# Patient Record
Sex: FEMALE | Race: WHITE
[De-identification: names, ages, dates, MRNs, and addresses within clinical notes are randomized per-mention and may not be internally consistent; named-entity substitution may affect disease eponyms.]

---

## 2018-07-17 NOTE — PCM.PREANE
Preanesthetic Assessment





- Procedure


Proposed Procedure: 





Lap APPY





- Anesthesia/Transfusion/Family Hx


Anesthesia History: Prior Anesthesia Without Reaction


Family History of Anesthesia Reaction: No


Transfusion History: No Prior Transfusion(s)





- Review of Systems


General: No Symptoms


Pulmonary: Other (asthma- prn use of inhaler )


Cardiovascular: No Symptoms


Gastrointestinal: No Symptoms


Neurological: No Symptoms


Other: Reports: Anxiety (not medicated )





- Physical Assessment


NPO Status Date: 07/16/18


NPO Status Time: 14:00


O2 Sat by Pulse Oximetry: 100


Respiratory Rate: 18


Vital Signs: 





 Last Vital Signs











Temp  36.4 C   07/17/18 01:12


 


Pulse  68   07/17/18 01:12


 


Resp  18   07/17/18 01:12


 


BP  119/67   07/17/18 01:12


 


Pulse Ox  100   07/17/18 01:12











Height: 1.57 m


Weight: 69.4 kg


ASA Class: 2


Mental Status: Alert & Oriented x3


Airway Class: Mallampati = 1


Dentition: Reports: Normal Dentition


Thyro-Mental Finger Breadths: 3


Mouth Opening Finger Breadths: 3


ROM/Head Extension: Full


Lungs: Clear to Auscultation, Normal Respiratory Effort


Cardiovascular: Regular Rate, Regular Rhythm





- Lab


Values: 





 Laboratory Last Values











WBC  11.43 K/mm3 (3.98-10.04)  H  07/17/18  01:50    


 


RBC  5.14 M/mm3 (3.98-5.22)   07/17/18  01:50    


 


Hgb  14.6 gm/L (11.2-15.7)   07/17/18  01:50    


 


Hct  43.3 % (34.1-44.9)   07/17/18  01:50    


 


MCV  84.2 fl (79.4-94.8)   07/17/18  01:50    


 


MCH  28.4 pg (25.6-32.2)   07/17/18  01:50    


 


MCHC  33.7 g/dl (32.2-35.5)   07/17/18  01:50    


 


RDW Std Deviation  41.7 fL (36.4-46.3)   07/17/18  01:50    


 


Plt Count  219 K/mm3 (182-369)   07/17/18  01:50    


 


MPV  10.7 fl (9.4-12.3)   07/17/18  01:50    


 


Neutrophils % (Manual)  90 % (40-60)  H  07/17/18  01:50    


 


Band Neutrophils %  0 % (0-10)   07/17/18  01:50    


 


Lymphocytes % (Manual)  4 % (20-40)  L  07/17/18  01:50    


 


Atypical Lymphs %  3 %  07/17/18  01:50    


 


Monocytes % (Manual)  0 % (2-10)  L  07/17/18  01:50    


 


Eosinophils % (Manual)  2 % (0.7-5.8)   07/17/18  01:50    


 


Basophils % (Manual)  1  (0.1-1.2)   07/17/18  01:50    


 


Platelet Estimate  Adequate   07/17/18  01:50    


 


Plt Morphology Comment  Normal   07/17/18  01:50    


 


RBC Morph Comment  Normal   07/17/18  01:50    


 


Sodium  138 mEq/L (136-145)   07/17/18  01:50    


 


Potassium  3.4 mEq/L (3.5-5.1)  L  07/17/18  01:50    


 


Chloride  104 mEq/L ()   07/17/18  01:50    


 


Carbon Dioxide  24 mEq/L (21-32)   07/17/18  01:50    


 


Anion Gap  13.4  (5-15)   07/17/18  01:50    


 


BUN  12 mg/dL (7-18)   07/17/18  01:50    


 


Creatinine  1.1 mg/dL (0.55-1.02)  H  07/17/18  01:50    


 


Est Cr Clr Drug Dosing  64.52 mL/min  07/17/18  01:50    


 


Estimated GFR (MDRD)  > 60 mL/min (>60)   07/17/18  01:50    


 


BUN/Creatinine Ratio  10.9  (14-18)  L  07/17/18  01:50    


 


Glucose  104 mg/dL ()   07/17/18  01:50    


 


Calcium  8.9 mg/dL (8.5-10.1)   07/17/18  01:50    


 


Total Bilirubin  0.4 mg/dL (0.2-1.0)   07/17/18  01:50    


 


AST  17 U/L (15-37)   07/17/18  01:50    


 


ALT  27 U/L (14-59)   07/17/18  01:50    


 


Alkaline Phosphatase  73 U/L ()   07/17/18  01:50    


 


Total Protein  8.1 g/dl (6.4-8.2)   07/17/18  01:50    


 


Albumin  4.1 g/dl (3.4-5.0)   07/17/18  01:50    


 


Globulin  4.0 gm/dL  07/17/18  01:50    


 


Albumin/Globulin Ratio  1.0  (1-2)   07/17/18  01:50    


 


Lipase  87 U/L ()   07/17/18  01:50    


 


Urine Color  Yellow  (Yellow)   07/17/18  02:04    


 


Urine Appearance  Clear  (Clear)   07/17/18  02:04    


 


Urine pH  5.5  (5.0-8.0)   07/17/18  02:04    


 


Ur Specific Gravity  > or = 1.030  (1.005-1.030)   07/17/18  02:04    


 


Urine Protein  Negative  (Negative)   07/17/18  02:04    


 


Urine Glucose (UA)  Negative  (Negative)   07/17/18  02:04    


 


Urine Ketones  Negative  (Negative)   07/17/18  02:04    


 


Urine Occult Blood  Negative  (Negative)   07/17/18  02:04    


 


Urine Nitrite  Negative  (Negative)   07/17/18  02:04    


 


Urine Bilirubin  Negative  (Negative)   07/17/18  02:04    


 


Urine Urobilinogen  0.2  (0.2-1.0)   07/17/18  02:04    


 


Ur Leukocyte Esterase  Negative  (Negative)   07/17/18  02:04    


 


Urine RBC  0-5 /hpf (0-5)   07/17/18  02:04    


 


Urine WBC  0-5 /hpf (0-5)   07/17/18  02:04    


 


Ur Epithelial Cells  0-5 /hpf (0-5)   07/17/18  02:04    


 


Urine Bacteria  Rare /hpf (FEW)   07/17/18  02:04    


 


Urine Mucus  Moderate /hpf (FEW)  H  07/17/18  02:04    


 


Urine HCG, Qual  Negative  (NEGATIVE)   07/17/18  02:04    














- Allergies


Allergies/Adverse Reactions: 


 Allergies











Allergy/AdvReac Type Severity Reaction Status Date / Time


 


No Known Allergies Allergy   Verified 07/17/18 01:14














- Blood


Blood Available: No


Product(s) Available: None





- Anesthesia Plan


Pre-Op Medication Ordered: None





- Acknowledgements


Anesthesia Type Planned: General Anesthesia


Pt an Appropriate Candidate for the Planned Anesthesia: Yes


Alternatives and Risks of Anesthesia Discussed w Pt/Guardian: Yes


Pt/Guardian Understands and Agrees with Anesthesia Plan: Yes





PreAnesthesia Questionnaire





- Past Surgical History


HEENT Surgical History: Reports: Oral Surgery ([place] teeth extraction)





- SUBSTANCE USE


Smoking Status *Q: Never Smoker


Second Hand Smoke Exposure: No


Recreational Drug Use History: Yes


Recreational Drug Type: Reports: Marijuana/Hashish (last smoked May 2018)





- HOME MEDS


Home Medications: 


 Home Meds





. [No Known Home Meds]  07/17/18 [History]











- CURRENT (IN HOUSE) MEDS


Current Meds: 





 Current Medications





Sodium Chloride (Normal Saline)  1,000 mls @ 150 mls/hr IV ASDIRECTED LOUIE


   Last Admin: 07/17/18 01:55 Dose:  150 mls/hr





Discontinued Medications





Diatrizoate Meglum/Diatrizoate Sod (Gastrografin 37%)  120 ml PO ONETIME ONE


   Stop: 07/17/18 03:20


   Last Admin: 07/17/18 03:21 Dose:  120 ml


Diatrizoate Meglum/Diatrizoate Sod (Gastrografin 37%)  120 ml PO ONETIME ONE


   Stop: 07/17/18 03:20


Hydromorphone HCl (Dilaudid)  0.5 mg IVPUSH ONETIME STA


   Stop: 07/17/18 01:47


   Last Admin: 07/17/18 01:58 Dose:  0.5 mg


Hydromorphone HCl (Dilaudid)  0.5 mg IVPUSH ONETIME ONE


   Stop: 07/17/18 04:38


   Last Admin: 07/17/18 04:42 Dose:  0.5 mg


Ertapenem 1 gm/ Sodium (Chloride)  100 mls @ 200 mls/hr IV ONETIME ONE


   Stop: 07/17/18 04:40


   Last Admin: 07/17/18 04:33 Dose:  200 mls/hr


Iopamidol (Isovue-300 (61%))  125 ml IVPUSH ONETIME ONE


   Stop: 07/17/18 03:20


   Last Admin: 07/17/18 03:21 Dose:  150 ml


Ondansetron HCl (Zofran)  4 mg IVPUSH ONETIME ONE


   Stop: 07/17/18 01:45


   Last Admin: 07/17/18 01:55 Dose:  4 mg


Ondansetron HCl (Zofran)  4 mg IVPUSH ONETIME ONE


   Stop: 07/17/18 04:57


   Last Admin: 07/17/18 05:01 Dose:  4 mg
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
## 2018-07-17 NOTE — PCM.POSTAN
POST ANESTHESIA ASSESSMENT





- MENTAL STATUS


Mental Status: Alert, Oriented





- VITAL SIGNS


Pulse Rate: 64


SaO2: 98


Resp Rate: 13


Blood Pressure: 136/96


Temperature: 36.7 C





- RESPIRATORY


Respiratory Status: Respiratory Rate WNL, Airway Patent, O2 Saturation Stable, 

Supplemental Oxygen





- CARDIOVASCULAR


CV Status: Pulse Rate WNL, Blood Pressure Stable





- GASTROINTESTINAL


GI Status: No Symptoms





- PAIN


Pain Score: 0





- POST OP HYDRATION


Hydration Status: Adequate & Stable

## 2018-07-17 NOTE — HP
DATE OF ADMISSION:  07/17/2018

 

CHIEF COMPLAINT:

Right lower quadrant pain.

 

HISTORY OF PRESENT ILLNESS:

Tim Seay is a very pleasant 18-year-old female who was last evening about 8

just did not feel well.  She had got vague abdominal pain and some nausea.  Over

the course of the evening, it localized to the right lower quadrant.  It is such

that she did not like to do any movement at all.  She has never had any of these

problems before.  She is not pregnant and is currently taking birth control.

She has never had any associated vaginal discharge or bloody diarrhea or

hematuria or dysuria.

 

She was subsequently seen here and had a CT scan which is consistent with acute

appendicitis.

 

Currently, the patient states she feels slightly better, but still is very sore

in the right lower quadrant.  She remains anorexic and she also states that she

is not hungry.

 

PAST MEDICAL HISTORY:

 

 

ALLERGIES:

As a child, she could not take penicillin, but according to her mother, she

feels she did take some of the other cillins.

 

CURRENT MEDICATIONS:

Birth control as well as an inhaler.

 

PAST SURGICAL HISTORY:

Negative.

 

SOCIAL HISTORY:

Smoking negative.  Alcohol socially.  She is single.  Her boyfriend is at her

side.  She has brothers and sisters who are alive and well.  Her mother had

undergone treatment for breast cancer.

 

REVIEW OF SYSTEMS:

No history of seizures, strokes, thyroid diabetes or hepatitis.  She denies

shortness of breath or cough.  No history of palpitations.  No history of

abdominal ulcers or any prior abdominal pain.  No change in her bowel habits.

No hematuria, dysuria.  She denies joint pain.  She denies bleeding disorders

and she denies ankle swelling or edema.

 

OCCUPATION:

Her occupation is a dancer.

 

PHYSICAL EXAMINATION:

GENERAL:  She is now comfortable.

HEENT:  Pupils equal.

NECK:  Without mass.

LUNGS:  Clear.

HEART:  Rhythm is regular.

ABDOMEN:  Mildly tender to deep palpation in the right lower quadrant, slightly

guarding.

EXTREMITIES:  Ankles are free of edema.

 

IMAGING STUDIES:

CT scan has been reviewed and clearly there is a markedly dilated very large

acute appendix.

 

IMPRESSION AND PLAN:

1. Indeed, she does have an acute appendicitis.  We discussed the multiple

    options now that one has with treating acute appendicitis, nonoperatively

    versus operatively.  Unfortunately, I think that due to the fact that the

    appendix is so markedly distended, that I would hesitate to treat this

    nonoperatively.  I clearly gave her this option, but I would recommend an

    attempt of laparoscopic appendectomy.  Risks of surgery include, but not

    limited to bleeding, infection, heart attack, death, injury to structures,

    not intended the need for open procedure, the need for a drain.  She

    understands if we do an open procedure, it will be midline.  Even with

    these risks, she wishes to proceed and will go ahead at this time.

2. History of asthma.

 

Thank you very much.  Her mother was at her side and was in agreement.

 

DD:  07/17/2018 06:37:13

DT:  07/17/2018 07:38:26  SERENA

Job #:  [id number]

## 2018-07-17 NOTE — CT
CT abdomen and pelvis

 

Technique: Multiple axial sections were obtained from above the dome 

of the diaphragm inferiorly through the pubic symphysis.  Intravenous 

and oral contrast was utilized.  Delayed images were obtained through 

the bladder.

 

Comparison: No prior abdominal imaging.

 

Findings: Appendix is dilated showing mild surrounding inflammatory 

change compatible with appendicitis.

 

Visualized lung bases show nothing acute.  Liver shows no focal 

parenchymal abnormality.  Gallbladder contains no calcified 

gallstones.  Spleen appears within normal limits.  Adrenal glands 

contain no nodules.  Pancreas is within normal limits.  Kidneys show 

symmetric contrast enhancement without hydronephrosis or mass.  

Abdominal aorta shows no aneurysmal dilatation.  No retroperitoneal 

adenopathy or mesenteric abnormalities are seen.  No pelvic mass or 

adenopathy is seen.  Minimal free fluid is seen within the pelvis 

which most likely is physiologic.

 

Delayed images show contrast within the distal ureters and within the 

bladder.

 

Bone window settings were reviewed and within normal limits for the 

patient's age.

 

Impression:

1.  Findings compatible with appendicitis.

2.  Small amount of fluid within the pelvis most likely representing 

physiologic fluid.

 

Diagnostic code #5

 

I agree with preliminary report issued by [facility] ([facility] report finalized 

on 07/17/18, 5:04 AM Central Time)

## 2018-07-18 NOTE — OR
DATE OF OPERATION:  07/17/2018

 

SURGEON:  Andreina Garcia MD

 

PREOPERATIVE DIAGNOSIS:

Acute appendicitis.

 

POSTOPERATIVE DIAGNOSIS:

Acute appendicitis.

 

OPERATION PERFORMED:  Laparoscopic appendectomy.

 

ANESTHESIA:

General with intubation.

 

ESTIMATED BLOOD LOSS:

Less than 10 mL.

 

REPLACEMENT:

Crystalloid.

 

BRIEF HISTORY:

This is a 18-year-old female, who arrived here with all the signs and symptoms

of acute appendicitis, which was confirmed by CT scan.  I had the opportunity to

discuss the risks and benefits of surgery, and she agreed to proceed.  Of note,

we did discuss non-operative surgery, but because of the way that the looks on

the CT scan, I do not think that this would be wise.

 

The patient was taken to the operating room.  A time-out was performed.  The

patient's abdomen was prepped and draped in usual fashion.  We waited for the 3

minutes for the prep to dry.  I injected 1% lidocaine in the infraumbilical area

and the suprapubic area.  A transverse incision was made and I dissected down to

the fascia.  I lifted the fascia between 2 sterile hemostats and made a nick in

the fascia.  I advanced the Veress needle and CO2 was insufflated successfully.

I then advanced a 5 mm port and confirmed that we had intraperitoneal

positioning.  I could see the inflamed appendix.  A suprapubic 12 and a 5 mm in

the right upper quadrant were placed under direct vision.  At this point, I

could easily see that the appendix was inflamed.  The base, however, was nice

and small.  I was able to make a window at the base of the appendix and

connected to the cecum and then fired the stapling device across this

successfully.  I then fired the stapling device, a portion of the mesoappendix

to start dividing this, but there was a slight bleeder in one of them, and I had

used multiple hemoclips.  Therefore, I elected to take down the rest of the

mesoappendix using hemoclips.  At the completion of this, I was able to place

this appendix in an Endobag and bring it out through the suprapubic incision.  I

then reassessed for hemostasis and irrigated.  There were just a couple little

oozing spots, which I was able to successfully place hemoclips to the point

where there was complete and good hemostasis.

 

I then irrigated and removed a little bit of blood that was in the

intraperitoneal cavity and I removed the fluid that had dripped down into the

pelvis.  Again, hemostasis was good and I placed the bowel in its normal

anatomical position.

 

At this point in time, I removed the suprapubic under direct vision, no

bleeding, and the 5 mm under direct vision and no bleeding was noted.  The

camera was removed.  I reapproximated the fascia of the 12 and the

infraumbilical 5 with a figure-of-eight using 0 PDS.  Subcuticular closure was

utilized with 4-0.  Benzoin and Steri-Strips were applied.

 

The patient tolerated the procedure well, was now brought to recovery room.

 

Instructions will be, she can shower tomorrow with the dressings off.

 

She should come back and see me Friday for followup, sooner if there are any

problems.

 

We are anticipating getting her home here today as she lives in [facility].

 

 

 

DD:  07/17/2018 08:36:37

DT:  07/17/2018 09:19:42  SERENA

Job #:  [id number]

## 2021-01-19 ENCOUNTER — HOSPITAL ENCOUNTER (EMERGENCY)
Dept: HOSPITAL 41 - JD.ED | Age: 21
Discharge: HOME | End: 2021-01-19
Payer: COMMERCIAL
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
## 2021-01-19 DIAGNOSIS — S20.211A: ICD-10-CM

## 2021-01-19 DIAGNOSIS — S50.11XA: Primary | ICD-10-CM
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
## 2021-01-19 NOTE — CR
Chest: 2 views of the chest were obtained.

 

Comparison: No prior chest imaging is available.

 

Heart size and mediastinum are normal.  Lungs are clear with no acute 

parenchymal change.  Bony structures are unremarkable.

 

Impression:

1.  Nothing acute is seen on 2 view chest x-ray.

 

Diagnostic code #1

## 2021-10-02 ENCOUNTER — HOSPITAL ENCOUNTER (EMERGENCY)
Dept: HOSPITAL 41 - JD.ED | Age: 21
Discharge: HOME | End: 2021-10-02
Payer: COMMERCIAL

## 2021-10-02 DIAGNOSIS — J45.909: ICD-10-CM

## 2021-10-02 DIAGNOSIS — O02.1: Primary | ICD-10-CM

## 2021-10-02 DIAGNOSIS — Z20.822: ICD-10-CM

## 2021-10-02 PROCEDURE — 84702 CHORIONIC GONADOTROPIN TEST: CPT

## 2021-10-02 PROCEDURE — 96374 THER/PROPH/DIAG INJ IV PUSH: CPT

## 2021-10-02 PROCEDURE — 96376 TX/PRO/DX INJ SAME DRUG ADON: CPT

## 2021-10-02 PROCEDURE — 96375 TX/PRO/DX INJ NEW DRUG ADDON: CPT

## 2021-10-02 PROCEDURE — 80048 BASIC METABOLIC PNL TOTAL CA: CPT

## 2021-10-02 PROCEDURE — 36415 COLL VENOUS BLD VENIPUNCTURE: CPT

## 2021-10-02 PROCEDURE — 85025 COMPLETE CBC W/AUTO DIFF WBC: CPT

## 2021-10-02 PROCEDURE — U0002 COVID-19 LAB TEST NON-CDC: HCPCS

## 2021-10-02 PROCEDURE — 76817 TRANSVAGINAL US OBSTETRIC: CPT

## 2021-10-02 PROCEDURE — 99284 EMERGENCY DEPT VISIT MOD MDM: CPT

## 2021-10-02 PROCEDURE — 87635 SARS-COV-2 COVID-19 AMP PRB: CPT

## 2021-10-02 NOTE — EDM.PDOC
ED HPI GENERAL MEDICAL PROBLEM





- General


Source of Information: Reports: Patient, Family, RN Notes Reviewed


History Limitations: Reports: No Limitations





- History of Present Illness


Onset: Today, Sudden


Duration: Heavy, Recurring


Severity: Severe


Improves with: Reports: None


Worsens with: Reports: None


  ** Abdominal


Pain Score (Numeric/FACES): 8





<Jesus Rivas - Last Filed: 10/02/21 19:34>





<Shayla Maldonado - Last Filed: 10/02/21 20:52>





- General


Chief Complaint: OB/GYN Problem


Stated Complaint: MISCARRIAGE/POSS INFECTION


Time Seen by Provider: 10/02/21 16:39





- History of Present Illness


INITIAL COMMENTS - FREE TEXT/NARRATIVE: 





Patient is her first time pregnant and is going through a miscarriage she found 

out 5 days ago that she was miscarrying.  She has had increasing vaginal 

bleeding today least 3-4 heavy bleeds today.  Clots noted.  She has increasing 

crampy pelvic pain that is described at 8-9 out of 10 pain.  Not nauseated no 

vomiting or diarrhea no burning pain or blood in the urine no coughing cold 

symptoms chest pain or breathing problems lightheaded but no fainting spell.  No

history of any blood clot problems or bleeding disorder not on any coagulants.  

Sure of any vaginal or female complications or problems in the past such as PID 

or torsion, has had an ovarian cyst but never required problems. (Jesus Rivas)





- Related Data


                                    Allergies











Allergy/AdvReac Type Severity Reaction Status Date / Time


 


No Known Allergies Allergy   Verified 10/02/21 16:39











Home Meds: 


                                    Home Meds





HYDROmorphone [Dilaudid] 2 mg PO Q4H PRN #6 tab 10/02/21 [Rx]











Past Medical History


Respiratory History: Reports: Asthma


OB/GYN History: Reports: Other (See Below)


Other OB/GYN History: misscarriage


Psychiatric History: Reports: Anxiety





- Infectious Disease History


Infectious Disease History: Reports: Novel Coronavirus





- Past Surgical History


HEENT Surgical History: Reports: Oral Surgery


GI Surgical History: Reports: Appendectomy





<Jesus Rivas - Last Filed: 10/02/21 19:34>





Social & Family History





- Family History


Family Medical History: No Pertinent Family History





- Tobacco Use


Tobacco Use Status *Q: Never Tobacco User


Second Hand Smoke Exposure: No





- Caffeine Use


Caffeine Use: Reports: None





- Recreational Drug Use


Recreational Drug Use: No





- Living Situation & Occupation


Living situation: Reports: Single, with Family


Occupation: Student ([employer])





<Jesus Rivas - Last Filed: 10/02/21 19:34>





ED ROS GENERAL





- Review of Systems


Review Of Systems: See Below


Constitutional: Reports: No Symptoms.  Denies: Fever, Chills


HEENT: Denies: Vision Change


Respiratory: Reports: No Symptoms.  Denies: Shortness of Breath


Cardiovascular: Denies: Chest Pain


GI/Abdominal: Reports: Abdominal Pain, Nausea.  Denies: Diarrhea, Vomiting


: Reports: Other (Heavy vaginal bleeding with clots no tissue passed.).  

Denies: Dysuria, Flank Pain


Musculoskeletal: Denies: Back Pain


Skin: Denies: Pallor


Neurological: Denies: Dizziness, Numbness, Syncope, Tingling


Psychiatric: Reports: No Symptoms


Hematologic/Lymphatic: Reports: No Symptoms





<Jesus Rivas - Last Filed: 10/02/21 19:34>





ED EXAM PREGNANCY





- Physical Exam


Exam: See Below


Exam Limited By: No Limitations


General Appearance: Alert, WD/WN, No Apparent Distress


Throat/Mouth: Normal Inspection


Neck: Normal Inspection


Respiratory/Chest: No Respiratory Distress


Cardiovascular: Normal Peripheral Pulses


GI/Abdominal Exam: Normal Bowel Sounds, Soft, No Distention, Tender, Other 

(Tender in the suprapubic pelvic region no masses palpated.)


Extremities: Normal Inspection, No Pedal Edema


Neurological: Alert, Oriented


Psychiatric: Normal Affect, Normal Mood


Skin Exam: Warm.  No: Cool, Diaphoretic





<Jesus Rivas - Last Filed: 10/02/21 19:34>





Course





<Jesus Rivas - Last Filed: 10/02/21 19:34>





<Shayla Maldonado V - Last Filed: 10/02/21 20:52>





- Vital Signs


Text/Narrative:: 





 with miscarriage, vaginal bleeding that is heavier with lightheadedness, c

rampy pelvic pain.  Will order an ultrasound to rule out retained products of 

conception, screen for anemia type and screen in case of blood transfusion, IV 

fluid resuscitation, review old records (Jesus Rivas)


Last Recorded V/S: 





                                Last Vital Signs











Temp  97.0 F   10/02/21 16:38


 


Pulse  88   10/02/21 16:38


 


Resp  20   10/02/21 16:38


 


BP  121/81   10/02/21 16:38


 


Pulse Ox  100   10/02/21 16:38














- Orders/Labs/Meds


Orders: 





                               Active Orders 24 hr











 Category Date Time Status


 


 Peripheral IV Care [RC] .AS DIRECTED Care  10/02/21 17:13 Active


 


 UA RFX SUJIT AND CULT IF INDIC [URIN] Stat Lab  10/02/21 17:12 Ordered


 


 Sodium Chloride 0.9% [Saline Flush] Med  10/02/21 17:12 Active





 10 ml FLUSH ASDIRECTED PRN   


 


 Peripheral IV Insertion Adult [OM.PC] Stat Oth  10/02/21 17:11 Ordered








                                Medication Orders





Sodium Chloride (Sodium Chloride 0.9% 10 Ml Syringe)  10 ml FLUSH ASDIRECTED PRN


   PRN Reason: Keep Vein Open


   Last Admin: 10/02/21 17:27  Dose: 10 ml


   Documented by: STACIE








Labs: 


                                Laboratory Tests











  10/02/21 10/02/21 10/02/21 Range/Units





  16:50 16:50 19:34 


 


WBC  9.15    (3.98-10.04)  K/mm3


 


RBC  4.57    (3.98-5.22)  M/mm3


 


Hgb  13.1    (11.2-15.7)  gm/dl


 


Hct  39.6    (34.1-44.9)  %


 


MCV  86.7    (79.4-94.8)  fl


 


MCH  28.7    (25.6-32.2)  pg


 


MCHC  33.1    (32.2-35.5)  g/dl


 


RDW Std Deviation  39.8    (36.4-46.3)  fL


 


Plt Count  303    (182-369)  K/mm3


 


MPV  10.5    (9.4-12.3)  fl


 


Neut % (Auto)  75.9 H    (34.0-71.1)  %


 


Lymph % (Auto)  17.9 L    (19.3-51.7)  %


 


Mono % (Auto)  4.6 L    (4.7-12.5)  %


 


Eos % (Auto)  1.0    (0.7-5.8)  


 


Baso % (Auto)  0.4    (0.1-1.2)  %


 


Neut # (Auto)  6.94 H    (1.56-6.13)  K/mm3


 


Lymph # (Auto)  1.64    (1.18-3.74)  K/mm3


 


Mono # (Auto)  0.42 H    (0.24-0.36)  K/mm3


 


Eos # (Auto)  0.09    (0.04-0.36)  K/mm3


 


Baso # (Auto)  0.04    (0.01-0.08)  K/mm3


 


Sodium   139   (136-145)  mEq/L


 


Potassium   3.9   (3.5-5.1)  mEq/L


 


Chloride   104   ()  mEq/L


 


Carbon Dioxide   24   (21-32)  mEq/L


 


Anion Gap   14.9   (5-15)  


 


BUN   15   (7-18)  mg/dL


 


Creatinine   1.1 H   (0.55-1.02)  mg/dL


 


Est Cr Clr Drug Dosing   60.02   mL/min


 


Estimated GFR (MDRD)   > 60   (>60)  mL/min


 


BUN/Creatinine Ratio   13.6 L   (14-18)  


 


Glucose   107 H   (70-99)  mg/dL


 


Calcium   8.7   (8.5-10.1)  mg/dL


 


HCG, Quant   523.0   mIU/mL


 


SARS-CoV-2 RNA (VALERIANO)    Negative  (NEGATIVE)  











Meds: 





Medications











Generic Name Dose Route Start Last Admin





  Trade Name Freq  PRN Reason Stop Dose Admin


 


Sodium Chloride  10 ml  10/02/21 17:12  10/02/21 17:27





  Sodium Chloride 0.9% 10 Ml Syringe  FLUSH   10 ml





  ASDIRECTED PRN   Administration





  Keep Vein Open  














Discontinued Medications














Generic Name Dose Route Start Last Admin





  Trade Name Freq  PRN Reason Stop Dose Admin


 


Hydromorphone HCl  1 mg  10/02/21 17:19  10/02/21 17:27





  Hydromorphone 1 Mg/Ml Syringe  IVPUSH  10/02/21 17:20  1 mg





  ONETIME ONE   Administration


 


Hydromorphone HCl  0.5 mg  10/02/21 18:40  10/02/21 18:44





  Hydromorphone 0.5 Mg/0.5 Ml Syringe  IVPUSH  10/02/21 18:41  0.5 mg





  ONETIME ONE   Administration


 


Hydromorphone HCl  4 mg  10/02/21 20:42 





  Hydromorphone 2 Mg Tab  PO  10/02/21 20:43 





  ONETIME ONE  


 


Sodium Chloride  1,000 mls @ 999 mls/hr  10/02/21 17:13  10/02/21 17:27





  Normal Saline  IV  10/02/21 18:13  999 mls/hr





  ONETIME ONE   Administration


 


Misoprostol  800 mcg  10/02/21 20:35 





  Misoprostol 25 Mcg (1/4 Of 100 Mcg) Tab  VAG  10/02/21 20:36 





  ONETIME ONE  


 


Misoprostol  800 mcg  10/02/21 20:42 





  Misoprostol 200 Mcg Tab  PO  10/02/21 20:43 





  ONETIME ONE  


 


Ondansetron HCl  4 mg  10/02/21 17:20  10/02/21 17:27





  Ondansetron 4 Mg/2 Ml Sdv  IVPUSH  10/02/21 17:21  4 mg





  ONETIME ONE   Administration














- Re-Assessments/Exams


Free Text/Narrative Re-Assessment/Exam: 





10/02/21 19:04


White counts 9100 hemoglobin 13.1 hematocrit 39.6 platelet count 303 sodium 139 

potassium 3.9 chloride 104 CO2 is 24 BUN is 15 creatinine 1.1 GFR is normal hCG 

is 523 (Jesus Rivas)


Free Text/Narrative Re-Assessment/Exam: 





10/02/21 19:04


Awaiting the vaginal ultrasound results.  Will get in touch with OB/GYN to 

discuss disposition plans.


10/02/21 19:34


The case with Dr. Bhatt on-call for OB/GYN who recommends a consideration whether

 patient would tolerate Cytotec 100 mcg take 4 tablets intravaginally x1 and 

repeat in 6 hours until she passes products of conception, strict return 

precautions otherwise be given and otherwise she will definitely need D&C.  We 

will make sure we understand her blood type however. (Jesus Rivas)





10/02/21 20:46


Patient's case was discussed with Dr. Rivas, as he was busy attending a 

STEMI. I will write for discharge instructions and get her discharged home. He 

does want the patient sent home with some oral Dilaudid, and he requested a 4 mg

 oral Dilaudid dose to be given to the patient before she leaves. (Shayla Maldonado)





Departure





<Jesus Rivas - Last Filed: 10/02/21 19:34>





- Departure


Time of Disposition: 20:50


Condition: Good





- Discharge Information


*PRESCRIPTION DRUG MONITORING PROGRAM REVIEWED*: Yes


*COPY OF PRESCRIPTION DRUG MONITORING REPORT IN PATIENT SATHYA: No





<Doniphan,Shayla V - Last Filed: 10/02/21 20:52>





- Departure


Disposition: Home, Self-Care 01


Clinical Impression: 


 Retained products of conception after miscarriage








- Discharge Information


Prescriptions: 


miSOPROStoL [Cytotec] 200 mcg VAG ONETIME #4 tablet


HYDROmorphone [Dilaudid] 2 mg PO Q4H PRN #6 tab


 PRN Reason: Pain


Instructions:  Incomplete Miscarriage


Referrals: 


Madelaine Gaston MD [Primary Care Provider] - 


Forms:  ED Department Discharge


Additional Instructions: 


You were evaluated in the ER today regarding your abdominal discomfort.





Your ultrasound demonstrated retained products of conception. You were given a 

dose of Cytotec, to help pass the retained products of conception, your first 

dose was given in the ER, you will need to repeat the second dose in 6 hours. 

That would be at roughly 3 AM in the morning. You will need to insert both 

tablets in your vagina at that time. You will have some abdominal cramping at 

that time. You were given a prescription for a strong pain medication, Dilaudid,

please take 1/2-1 tab every 4 hours as needed for pain.  These medications can 

be addictive, so please take as few as possible to achieve adequate pain 

control.  These meds can also be quite constipating, recommend that you increase

your oral fluid intake and take a stool softener like MiraLAX while taking these

medications. Do not drive while taking this medication.





If you are bleeding through more than 1-2 maxi pads every couple hours, this 

would be cause for concern to return to the ER for immediate management. If you 

should have any sort of foul-smelling discharge, do not hesitate to return to 

the ER for more urgent management.





Otherwise you should please follow up with your OB/GYN on Monday morning as 

early as you possibly can. Call her office to schedule an appointment, as you 

may necessitate a D&C, this is a surgical procedure.





Please return to the ED at any time if your symptoms change or worsen.





Sepsis Event Note (ED)





- Focused Exam


Vital Signs: 





                                   Vital Signs











  Temp Pulse Resp BP Pulse Ox


 


 10/02/21 16:38  97.0 F  88  20  121/81  100

## 2021-10-02 NOTE — US
Obstetrical ultrasound: Multiple real-time images were obtained 

transvaginally.

 

Comparison: No previous study available for this pregnancy.

 

Findings: No intrauterine gestational sac is seen.  Endometrium is 

thickened and is heterogeneous which shows increased vascularity 

presumably due to retained products of conception.  Endometrial 

thickness has an AP dimension 1.5 cm with transverse dimension of 2.0 

cm.

 

Ovaries appear within normal limits.  No adnexal abnormalities are 

seen.  No free fluid is seen.

 

Impression:

1.  Heterogeneously thickened endometrium showing increased 

vascularity presumably due to retained products of conception.  

Measurements as noted above.

2.  No intrauterine gestational sac or adnexal abnormality is seen.  

No additional abnormality is appreciated.

 

Diagnostic code #3

## 2022-11-21 ENCOUNTER — HOSPITAL ENCOUNTER (INPATIENT)
Dept: HOSPITAL 41 - JD.OB | Age: 22
LOS: 3 days | Discharge: HOME | DRG: 560 | End: 2022-11-24
Attending: OBSTETRICS & GYNECOLOGY | Admitting: OBSTETRICS & GYNECOLOGY
Payer: COMMERCIAL

## 2022-11-21 DIAGNOSIS — O48.0: Primary | ICD-10-CM

## 2022-11-21 DIAGNOSIS — Z86.16: ICD-10-CM

## 2022-11-21 DIAGNOSIS — F41.0: ICD-10-CM

## 2022-11-21 DIAGNOSIS — Z3A.40: ICD-10-CM

## 2022-11-21 DIAGNOSIS — Z90.49: ICD-10-CM

## 2022-11-21 RX ADMIN — Medication PRN ML: at 18:17

## 2022-11-21 RX ADMIN — Medication SCH: at 22:27

## 2022-11-21 RX ADMIN — Medication SCH MLS/HR: at 12:10

## 2022-11-22 PROCEDURE — 00HU33Z INSERTION OF INFUSION DEVICE INTO SPINAL CANAL, PERCUTANEOUS APPROACH: ICD-10-PCS | Performed by: OBSTETRICS & GYNECOLOGY

## 2022-11-22 PROCEDURE — 0KQM0ZZ REPAIR PERINEUM MUSCLE, OPEN APPROACH: ICD-10-PCS | Performed by: OBSTETRICS & GYNECOLOGY

## 2022-11-22 PROCEDURE — 3E0R3BZ INTRODUCTION OF ANESTHETIC AGENT INTO SPINAL CANAL, PERCUTANEOUS APPROACH: ICD-10-PCS | Performed by: OBSTETRICS & GYNECOLOGY

## 2022-11-22 PROCEDURE — 3E033VJ INTRODUCTION OF OTHER HORMONE INTO PERIPHERAL VEIN, PERCUTANEOUS APPROACH: ICD-10-PCS | Performed by: OBSTETRICS & GYNECOLOGY

## 2022-11-22 PROCEDURE — 10907ZC DRAINAGE OF AMNIOTIC FLUID, THERAPEUTIC FROM PRODUCTS OF CONCEPTION, VIA NATURAL OR ARTIFICIAL OPENING: ICD-10-PCS | Performed by: OBSTETRICS & GYNECOLOGY

## 2022-11-22 RX ADMIN — Medication PRN ML: at 03:08

## 2022-11-22 RX ADMIN — Medication SCH: at 11:22

## 2022-11-22 RX ADMIN — Medication SCH MLS/HR: at 03:11

## 2022-11-23 RX ADMIN — VITAMIN A, ASCORBIC ACID, CHOLECALCIFEROL, .ALPHA.-TOCOPHEROL ACETATE, DL-, THIAMINE MONONITRATE, RIBOFLAVIN, NIACINAMIDE, PYRIDOXINE HYDROCHLORIDE, FOLIC ACID, CYANOCOBALAMIN, CALCIUM CARBONATE, IRON, ZINC OXIDE, AND CUPRIC OXIDE SCH EACH: 4000; 120; 400; 22; 1.84; 3; 20; 10; 1; 12; 200; 29; 25; 2 TABLET ORAL at 08:47

## 2022-11-24 RX ADMIN — VITAMIN A, ASCORBIC ACID, CHOLECALCIFEROL, .ALPHA.-TOCOPHEROL ACETATE, DL-, THIAMINE MONONITRATE, RIBOFLAVIN, NIACINAMIDE, PYRIDOXINE HYDROCHLORIDE, FOLIC ACID, CYANOCOBALAMIN, CALCIUM CARBONATE, IRON, ZINC OXIDE, AND CUPRIC OXIDE SCH EACH: 4000; 120; 400; 22; 1.84; 3; 20; 10; 1; 12; 200; 29; 25; 2 TABLET ORAL at 10:13
